# Patient Record
Sex: FEMALE | Race: WHITE | URBAN - METROPOLITAN AREA
[De-identification: names, ages, dates, MRNs, and addresses within clinical notes are randomized per-mention and may not be internally consistent; named-entity substitution may affect disease eponyms.]

---

## 2022-08-25 ENCOUNTER — HOSPITAL ENCOUNTER (OUTPATIENT)
Dept: PHYSICAL THERAPY | Age: 72
Discharge: HOME | End: 2022-08-25
Payer: MEDICARE

## 2022-08-25 PROCEDURE — 97530 THERAPEUTIC ACTIVITIES: CPT

## 2022-08-25 PROCEDURE — 97162 PT EVAL MOD COMPLEX 30 MIN: CPT

## 2022-08-25 PROCEDURE — 97110 THERAPEUTIC EXERCISES: CPT

## 2022-08-25 NOTE — PROGRESS NOTES
PT DAILY TREATMENT NOTE/LSVT BIG EVAL 10-18    Patient Name: Zulma Roman  Date:2022  : 1950  [x]  Patient  Verified  Payor: Eva Barron / Plan: [address] / Product Type: Managed Care Medicare /    In time:1102  Out time:1149  Total Treatment Time (min): 47  Visit #: 1 of 16    Medicare/BCBS Only   Total Timed Codes (min):  23 1:1 Treatment Time:  47     Treatment Area: Other abnormal involuntary movements [R25.8]  Parkinson's disease [G20]    SUBJECTIVE  Pain Level (0-10 scale): 5/10  []constant [x]intermittent []improving []worsening []no change since onset    Any medication changes, allergies to medications, adverse drug reactions, diagnosis change, or new procedure performed?: [x] No    [] Yes (see summary sheet for update)  Subjective functional status/changes:     PLOF: ambulatory with [facility] prior to July, lives with , loves sewing, painting, do crafts, swim, left handed,   Mechanism of Injury: Pt reports that she had a tremor in left hand tremor and left leg tremors along with altered gait pattern which sent her the the MD.  Pt reported that she received a parkinson's diagnosis in 2022. Pt was referred to PT for gait training and strengthening. Pt reported last fall was in January backwards. Current symptoms/Complaints: tremors left hand, tremors left leg, difficulty with buttoning, difficulty with dressing, difficulty with cooking, Falls, difficulty with walking, difficulty with opening jars, difficulty with holding glassware, difficulty with doing craft. Previous Treatment/Compliance: Carvadopa levadopa   PMHx/Surgical Hx: sciatica, Mini stroke,  Right knee OA, Osteoporosis, High BP, Tyroid disorders, Crohns, Lalo Foot toe fixation, scoliosis, urinary incontinence. Work Hx: retired  Living Situation: 1 story house, 2 steps to hobby room, lives with . Pt Goals:  To be able to be stronger, have better balance, and improve my walking   Barriers: []pain []financial []time []transportation []other  Motivation: good   Substance use: []Alcohol []Tobacco []other:   Cognition: A & O x 3    Other:    Functional Activities:    OBJECTIVE    24 min [x]Eval                  []Re-Eval       8 min Therapeutic Exercise:  [] See flow sheet : education on Big Exercises and how to incorporate at home    Rationale: increase ROM, increase strength, improve coordination, improve balance, and increase proprioception to improve the patients ability to restore PLOF    15 min Therapeutic Activity:  []  See flow sheet : PT educated on LSVT and how it effects parkinsons, education on parkinson's diagnosis and defects associated with it. PT educated on lifestyle change and BIG concepts.     Rationale: increase ROM, increase strength, improve coordination, improve balance, and increase proprioception  to improve the patients ability to complete transfers and ADLs without external assist             With   [x] TE   [x] TA   [] neuro   [] other: Patient Education: [x] Review HEP    [] Progressed/Changed HEP based on:   [] positioning   [] body mechanics   [] transfers   [] heat/ice application    [] other:        General Evaluation    Posture: [] Poor    [x] Fair    [] Good    Describe:   anterior trunk lean    Gait: [] Normal    [x] Abnormal    Device:    rollator   Description: decreased foot clearance left leg    ROM/Strength      Strength (1-5)  Hip Left Right   Flexion 3+ 3+   Abduction 3+ 3+   ER 3+ 3+   IR 3+ 3+   Knee Left Right   Extension 3+ 3+   Flexion 3+ 3+   Ankle     Plantar Flexion 2+ 2+   Dorsiflexion 3+ 3+     Strength (MMT):  Shoulder Left  (1-5) Right (1-5)   Shoulder Flexion 3+ 3+   Shoulder ABD 3+ 3+   Shoulder IR 3 3+   Shoulder ER 3 3+         Elbow  Left (0-5) Right  (0-5)   Flexion 3+ 3+   Extension 3+ 3+    fair fair                                             Optional Tests:        MiniBestEST test: 17/28    Other test /comments:  TU sec  5x Sit<>stand: 12 sec     Pain Level (0-10 scale) post treatment: 5/10    ASSESSMENT/Changes in Function: 82 yo female who presents to [facility] with new diagnosis of Parkinson's disease. Pt reports being ambulatory with [facility] prior to July. Pt lives with  and loves sewing, painting, do crafts, swim. Pt reports primary co tremors left hand, tremors left leg, difficulty with buttoning, difficulty with dressing, difficulty with cooking, Falls, difficulty with walking, difficulty with opening jars, difficulty with holding glassware, and difficulty with doing crafts. PT has initially tried medication of carbidopa/levodopa with slight reductions in spasms. Patient demonstrates decreased ROM, decreased strength, impaired posture, impaired gait mechancis, pain and decreased functional mobility tolerance. Patient will continue to benefit from skilled PT services to modify and progress therapeutic interventions, address functional mobility deficits, address ROM deficits, address strength deficits, analyze and cue movement patterns, analyze and modify body mechanics/ergonomics, assess and modify postural abnormalities, address imbalance/dizziness and instruct in home and community integration to attain remaining goals. Patient is appropriate for the evidence based LSVT BIG treatment, an intensive four-week therapy intervention which trains clients with Parkinson's disease and other disorders to move with increased (more normal) amplitude and improved quality of movement used in their every day lives. This is done through one-hour long skilled therapy sessions four times a week for four weeks focusing on progressively challenging large amplitude exercises, functional activities and gait training.   Client is then responsible for a homework portion where exercises and learned skilled are practiced one to two times a day during and after completion of program.       [x]  See Plan of Care  []  See progress note/recertification  []  See Discharge Summary         Progress towards goals / Updated goals:  Short Term Goals: To be accomplished in 2 weeks:   Patient will report compliance with HEP at least 1x/day to aid in rehabilitation program.   Status at IE: to be given at visit 2   Current:     Patient will decrease TUG by 3 seconds to display MCID and progression to decreased falls risk. Status at IE: 13 sec   Current:      Patient will improve Mini BEST est test score to 25/28 to improve safety with gait activities. Eval Status: 17/28       Long Term Goals: To be accomplished in 6 weeks:   Patient will increase strength as indicated by sit to stand transfers to 5 repetitions in < 10  seconds throughout Lalo LEs to aid in return recreational activities and ADLs. Status at IE: 12 sec   Current:     Patient will be able to ambulate 1000 feet with SPC and good step over step gait pattern to demonstrate decreased risk for falls. Status at IE: ambulatory with Rollator and SPC using SBA for cane and mod ind for rollator. Anterior trunk lean with both AD. Decreased foot clearance noted    Current:      Patient will improve FOTO to full points overall to demonstrate improvement in functional ability.    Status at IE:to be taken at visit 2    Current:    PLAN  []  Upgrade activities as tolerated     [x]  Continue plan of care  []  Update interventions per flow sheet       []  Discharge due to:_  []  Other:_      Juan Reed, PT 8/25/2022  10:07 AM

## 2022-08-25 NOTE — PROGRESS NOTES
[facility]  [address] [facility]  [facility], [address]  Ph [phone]  Fx [phone]    Plan of Care/ Statement of Necessity for Physical Therapy Services    Patient name: Lupe Gresham Start of Care: 2022   Referral source: Raheem Amin MD : 1950    Medical Diagnosis: Other abnormal involuntary movements [R25.8]  Parkinson's disease [G20]   Onset Date:2022    Treatment Diagnosis: Other abnormal involuntary movements; parkinsons disease                                              ICD-10: R25.8, G20   Prior Hospitalization: see medical history Provider#: [id number]   Medications: Verified on Patient summary List    Comorbidities:  sciatica, Mini stroke,  Right knee OA, Osteoporosis, High BP, Tyroid disorders, Crohns, Lalo Foot toe fixation, scoliosis, urinary incontinence. Prior Level of Function: ambulatory with [facility] prior to July, lives with , loves sewing, painting, do crafts, swim, left handed      The Plan of Care and following information is based on the information from the initial evaluation. Assessment/ key information: 80 yo female who presents to [facility] with new diagnosis of Parkinson's disease. Pt reports being ambulatory with [facility] prior to July. Pt lives with  and loves sewing, painting, do crafts, swim. Pt reports primary co tremors left hand, tremors left leg, difficulty with buttoning, difficulty with dressing, difficulty with cooking, Falls, difficulty with walking, difficulty with opening jars, difficulty with holding glassware, and difficulty with doing crafts. PT has initially tried medication of carbidopa/levodopa with slight reductions in spasms. Patient demonstrates decreased ROM, decreased strength, impaired posture, impaired gait mechancis, pain and decreased functional mobility tolerance.      Patient will continue to benefit from skilled PT services to modify and progress therapeutic interventions, address functional mobility deficits, address ROM deficits, address strength deficits, analyze and cue movement patterns, analyze and modify body mechanics/ergonomics, assess and modify postural abnormalities, address imbalance/dizziness and instruct in home and community integration to attain remaining goals. Patient is appropriate for the evidence based LSVT BIG treatment, an intensive four-week therapy intervention which trains clients with Parkinson's disease and other disorders to move with increased (more normal) amplitude and improved quality of movement used in their every day lives. This is done through one-hour long skilled therapy sessions four times a week for four weeks focusing on progressively challenging large amplitude exercises, functional activities and gait training.   Client is then responsible for a homework portion where exercises and learned skilled are practiced one to two times a day during and after completion of program.    Evaluation Complexity History HIGH Complexity :3+ comorbidities / personal factors will impact the outcome/ POC ; Examination HIGH Complexity : 4+ Standardized tests and measures addressing body structure, function, activity limitation and / or participation in recreation  ;Presentation MEDIUM Complexity : Evolving with changing characteristics  Overall Complexity Rating: MEDIUM    Problem List: pain affecting function, decrease ROM, decrease strength, edema affecting function, impaired gait/ balance, decrease ADL/ functional abilitiies, decrease activity tolerance, decrease flexibility/ joint mobility, and decrease transfer abilities   Treatment Plan may include any combination of the following: Therapeutic exercise, Therapeutic activities, Neuromuscular re-education, Physical agent/modality, Gait/balance training, Manual therapy, Aquatic therapy, Patient education, Self Care training, Functional mobility training, Home safety training, and Stair training  Patient / Family readiness to learn indicated by: asking questions, trying to perform skills, and interest  Persons(s) to be included in education: patient (P)  Barriers to Learning/Limitations: None  Measures taken if barriers to learning: NA  Patient Goal (s): : To be able to be stronger, have better balance, and improve my walking   Patient Self Reported Health Status: fair  Rehabilitation Potential: good    Short Term Goals: To be accomplished in 2 weeks:              Patient will report compliance with HEP at least 1x/day to aid in rehabilitation program.              Status at IE: to be given at visit 2              Current:                 Patient will decrease TUG by 3 seconds to display MCID and progression to decreased falls risk. Status at IE: 13 sec              Current:                            Patient will improve Mini BEST est test score to 25/28 to improve safety with gait activities. Eval Status: 17/28        Long Term Goals: To be accomplished in 6 weeks:              Patient will increase strength as indicated by sit to stand transfers to 5 repetitions in < 10  seconds throughout Lalo LEs to aid in return recreational activities and ADLs. Status at IE: 12 sec              Current:                 Patient will be able to ambulate 1000 feet with SPC and good step over step gait pattern to demonstrate decreased risk for falls. Status at IE: ambulatory with Rollator and SPC using SBA for cane and mod ind for rollator. Anterior trunk lean with both AD. Decreased foot clearance noted               Current:                            Patient will improve FOTO to full points overall to demonstrate improvement in functional ability. Status at IE:to be taken at visit 2               Current:    Frequency / Duration: Patient to be seen 4 times per week for 4 weeks.     Patient/ Caregiver education and instruction: Diagnosis, prognosis, self care, activity modification, and exercises   [x]  Plan of care has been reviewed with PTA    Certification Period: 08/25/22-11/23/22    Cathie Lomeli, PT 8/25/2022 10:06 AM    ________________________________________________________________________    I certify that the above Therapy Services are being furnished while the patient is under my care. I agree with the treatment plan and certify that this therapy is necessary.     Physician's Signature:_____________________Date:____________TIME:________                                      Matheus Garcia MD      ** Signature, Date and Time must be completed for valid certification **  Please sign and return to [facility] at [facility]  [address], [address] [facility]  Ph [phone]  Fx [phone]

## 2022-08-30 ENCOUNTER — HOSPITAL ENCOUNTER (OUTPATIENT)
Dept: PHYSICAL THERAPY | Age: 72
Discharge: HOME | End: 2022-08-30
Payer: MEDICARE

## 2022-08-30 PROCEDURE — 97530 THERAPEUTIC ACTIVITIES: CPT

## 2022-08-30 PROCEDURE — 97535 SELF CARE MNGMENT TRAINING: CPT

## 2022-08-30 PROCEDURE — 97112 NEUROMUSCULAR REEDUCATION: CPT

## 2022-08-30 NOTE — PROGRESS NOTES
PT DAILY TREATMENT NOTE    Patient Name: Katie Parekh  Date:2022  : 1950  [x]  Patient  Verified  Payor: Shasta Suero / Plan: [employer] [address] / Product Type: Managed Care Medicare /    In time:10:15  Out time:11:09  Total Treatment Time (min): 54  Total Timed Codes (min): 54  1:1 Treatment Time (MC/BCBS only): 54   Visit #: 2 of 16    Treatment Dx: Other abnormal involuntary movements [R25.8]  Parkinson's disease [G20]    SUBJECTIVE  Pain Level (0-10 scale): 10  Any medication changes, allergies to medications, adverse drug reactions, diagnosis change, or new procedure performed?: [x] No    [] Yes (see summary sheet for update)  Subjective functional status/changes:   [] No changes reported  \"I have some pain in my back from scoliosis. \"    OBJECTIVE    12 min Therapeutic Activity:  [x]  See flow sheet :   Rationale: increase ROM, increase strength, and improve coordination  to improve the patients ability to return to prior level of physical activity. 30 min Neuromuscular Re-education:  [x]  See flow sheet :   Rationale: increase ROM, increase strength, and improve coordination  to improve the patients ability to return to prior level of physical activity. 12 min Self Care: Reviewed LS   Rationale:    increase ROM, increase strength, and improve coordination to improve the patients ability to return to prior level of physical activity. With   [] TE   [] TA   [] neuro   [] other: Patient Education: [x] Review HEP    [] Progressed/Changed HEP based on:   [] positioning   [] body mechanics   [] transfers   [] heat/ice application    [] other:      Other Objective/Functional Measures:  Other Objective/Functional Measures  Vitals After Ex End of Tx   Heart Rate 101 bpm 84 bpm   Blood pressure 190/95 mmHg 160/78 mmHg   SpO2 98% 97%          Pain Level (0-10 scale) post treatment: 3/10    ASSESSMENT/Changes in Function: Pt arrived reporting mild pain in low back. Pt was able to perform LSVT BIG ex with no increased pain but, did require several rest breaks. Pt did have instance of mild light headedness and after assessing BP, pt was found to have a greatly increased BP. Pt returned to more normal levels after short rest break but, remained with elevated systolic post tx. Pt reports currently on BP medication and has a follow-up with PCP on Friday 9/2/22. Pt demonstrated good performance with BIG ex overall requiring moderate cues throughout for BIG UE movements and proper weight shifts. Patient will continue to benefit from skilled PT services to modify and progress therapeutic interventions, address functional mobility deficits, address ROM deficits, address strength deficits, analyze and address soft tissue restrictions, analyze and cue movement patterns, and analyze and modify body mechanics/ergonomics to attain remaining goals. [x]  See Plan of Care  []  See progress note/recertification  []  See Discharge Summary         Progress towards goals / Updated goals:  Short Term Goals: To be accomplished in 2 weeks:              Patient will report compliance with HEP at least 1x/day to aid in rehabilitation program.              Status at IE: to be given at visit 2              Current:Pt reports attempted compliance every day, 1x/day 8/30/22                 Patient will decrease TUG by 3 seconds to display MCID and progression to decreased falls risk. Status at IE: 13 sec              Current:                            Patient will improve Mini BEST est test score to 25/28 to improve safety with gait activities. Eval Status: 17/28        Long Term Goals: To be accomplished in 6 weeks:              Patient will increase strength as indicated by sit to stand transfers to 5 repetitions in < 10  seconds throughout Lalo LEs to aid in return recreational activities and ADLs.               Status at IE: 12 sec Current:                 Patient will be able to ambulate 1000 feet with SPC and good step over step gait pattern to demonstrate decreased risk for falls. Status at IE: ambulatory with Rollator and SPC using SBA for cane and mod ind for rollator. Anterior trunk lean with both AD. Decreased foot clearance noted               Current:                            Patient will improve FOTO to full points overall to demonstrate improvement in functional ability.               Status at IE:to be taken at visit 2               Current:       PLAN  [x]  Upgrade activities as tolerated     [x]  Continue plan of care  []  Update interventions per flow sheet       []  Discharge due to:_  []  Other:_      Segundo Washburn PTA 8/30/2022  10:12 AM    Future Appointments   Date Time Provider Keagan Marshall   8/30/2022 10:15 AM [facility]   8/31/2022  1:15 PM [facility]   9/2/2022 11:00 AM [facility]   9/7/2022 11:00 AM [facility] [place], [facility]   9/9/2022 11:00 AM [facility]   9/20/2022  2:45 PM [facility]   9/21/2022 11:00 AM [facility] [place]   9/22/2022 11:00 AM [facility] [place], [facility]   9/23/2022 11:00 AM [facility]   9/26/2022 11:00 AM [facility]   9/27/2022 11:00 AM [facility]   9/28/2022 11:00 AM [facility]   9/29/2022 12:30 PM Kitty Watkins [facility]

## 2022-08-31 ENCOUNTER — HOSPITAL ENCOUNTER (OUTPATIENT)
Dept: PHYSICAL THERAPY | Age: 72
Discharge: HOME | End: 2022-08-31
Payer: MEDICARE

## 2022-08-31 PROCEDURE — 97535 SELF CARE MNGMENT TRAINING: CPT

## 2022-08-31 PROCEDURE — 97112 NEUROMUSCULAR REEDUCATION: CPT

## 2022-08-31 PROCEDURE — 97530 THERAPEUTIC ACTIVITIES: CPT

## 2022-08-31 NOTE — PROGRESS NOTES
PT DAILY TREATMENT NOTE    Patient Name: Melyssa Ayoub  Date:2022  : 1950  [x]  Patient  Verified  Payor: [employer] / Plan: [address] / Product Type: Managed Care Medicare /    In time:1:20  Out time:2:15  Total Treatment Time (min): 55  Total Timed Codes (min): 55  1:1 Treatment Time (MC/BCBS only): 55   Visit #: 3 of 16    Treatment Dx: Other abnormal involuntary movements [R25.8]  Parkinson's disease [G20]    SUBJECTIVE  Pain Level (0-10 scale): 4/10  Any medication changes, allergies to medications, adverse drug reactions, diagnosis change, or new procedure performed?: [x] No    [] Yes (see summary sheet for update)  Subjective functional status/changes:   [] No changes reported  \"I just have some pain in my back. \"    OBJECTIVE    12 min Therapeutic Activity:  [x]  See flow sheet :   Rationale: increase ROM, increase strength, and improve coordination  to improve the patients ability to return to prior level of physical activity. 33 min Neuromuscular Re-education:  [x]  See flow sheet :   Rationale: increase ROM, increase strength, and improve coordination  to improve the patients ability to return to prior level of physical activity. 10 min Self Care: Continued education with big gait mechanics and reviewed carryover of gripping activities for home performance   Rationale:    increase ROM, increase strength, and improve coordination to improve the patients ability to return to prior level of physical activity. With   [] TE   [] TA   [] neuro   [] other: Patient Education: [x] Review HEP    [] Progressed/Changed HEP based on:   [] positioning   [] body mechanics   [] transfers   [] heat/ice application    [] other:      Other Objective/Functional Measures:  Other Objective/Functional Measures  Vitals Pre After several exercises After 5 min rest Post Tx   Heart Rate 88 bpm 104 bpm  88 bpm 92 bpm   Blood pressure 170/82 mmHg 203/82 mmHg 182/83 mmHg 175/79 mmHg   SpO2 98% 96% 98% 98%          Pain Level (0-10 scale) post treatment: 3/10    ASSESSMENT/Changes in Function: Pt arrived reporting mild pain in low back and elevated BP level with no symptoms. Pt demonstrated increased shortness of breath and elevated vitals upon assessment but, again no abnormal symptoms associated with elevated BP. Pt performed BIG ex well with SBA but, no instances of loss of balance. Pt performed hand gripping activities well with increased fatigue but, not above tolerance. Pt reported mild low back pain post tx. Patient will continue to benefit from skilled PT services to modify and progress therapeutic interventions, address functional mobility deficits, address ROM deficits, address strength deficits, analyze and address soft tissue restrictions, analyze and cue movement patterns, and analyze and modify body mechanics/ergonomics to attain remaining goals. [x]  See Plan of Care  []  See progress note/recertification  []  See Discharge Summary         Progress towards goals / Updated goals:  Short Term Goals: To be accomplished in 2 weeks:              Patient will report compliance with HEP at least 1x/day to aid in rehabilitation program.              Status at IE: to be given at visit 2              Current:Pt reports attempted compliance every day, 1x/day 8/30/22                 Patient will decrease TUG by 3 seconds to display MCID and progression to decreased falls risk. Status at IE: 13 sec              Current:                            Patient will improve Mini BEST est test score to 25/28 to improve safety with gait activities. Eval Status: 17/28    Current: Pt performs standing BIG ex with SBA 8/31/22     Long Term Goals:  To be accomplished in 6 weeks:              Patient will increase strength as indicated by sit to stand transfers to 5 repetitions in < 10  seconds throughout Lalo LEs to aid in return recreational activities and ADLs. Status at IE: 12 sec              Current:                 Patient will be able to ambulate 1000 feet with SPC and good step over step gait pattern to demonstrate decreased risk for falls. Status at IE: ambulatory with Rollator and SPC using SBA for cane and mod ind for rollator. Anterior trunk lean with both AD. Decreased foot clearance noted               Current:                            Patient will improve FOTO to full points overall to demonstrate improvement in functional ability.               Status at IE:to be taken at visit 2               Current:    PLAN  [x]  Upgrade activities as tolerated     [x]  Continue plan of care  []  Update interventions per flow sheet       []  Discharge due to:_  []  Other:_      David Burrows PTA 8/31/2022  11:39 AM    Future Appointments   Date Time Provider Keagan Marshall   8/31/2022  1:15 PM [facility]   9/2/2022 11:00 AM [facility]   9/7/2022 11:00 AM Yuliya Chan [facility]   9/9/2022 11:00 AM [facility]   9/20/2022  2:45 PM [facility]   9/21/2022 11:00 AM Yuliya Chan [facility]   9/22/2022 11:00 AM Yuliya Chan [facility]   9/23/2022 11:00 AM [facility]   9/26/2022 11:00 AM [facility]   9/27/2022 11:00 AM [facility]   9/28/2022 11:00 AM [facility]   9/29/2022 12:30 PM Catarina Dalton [facility] THE [facility]

## 2022-09-02 ENCOUNTER — HOSPITAL ENCOUNTER (OUTPATIENT)
Dept: PHYSICAL THERAPY | Age: 72
Discharge: HOME | End: 2022-09-02
Payer: MEDICARE

## 2022-09-02 ENCOUNTER — TELEPHONE (OUTPATIENT)
Dept: PHYSICAL THERAPY | Age: 72
End: 2022-09-02

## 2022-09-02 PROCEDURE — 97112 NEUROMUSCULAR REEDUCATION: CPT

## 2022-09-02 PROCEDURE — 97535 SELF CARE MNGMENT TRAINING: CPT

## 2022-09-07 ENCOUNTER — APPOINTMENT (OUTPATIENT)
Dept: PHYSICAL THERAPY | Age: 72
End: 2022-09-07
Payer: MEDICARE

## 2022-09-09 ENCOUNTER — APPOINTMENT (OUTPATIENT)
Dept: PHYSICAL THERAPY | Age: 72
End: 2022-09-09
Payer: MEDICARE

## 2022-09-20 ENCOUNTER — APPOINTMENT (OUTPATIENT)
Dept: PHYSICAL THERAPY | Age: 72
End: 2022-09-20
Payer: MEDICARE

## 2022-09-21 ENCOUNTER — APPOINTMENT (OUTPATIENT)
Dept: PHYSICAL THERAPY | Age: 72
End: 2022-09-21
Payer: MEDICARE

## 2022-09-22 ENCOUNTER — APPOINTMENT (OUTPATIENT)
Dept: PHYSICAL THERAPY | Age: 72
End: 2022-09-22
Payer: MEDICARE

## 2022-09-22 ENCOUNTER — TELEPHONE (OUTPATIENT)
Dept: PHYSICAL THERAPY | Age: 72
End: 2022-09-22

## 2022-09-23 ENCOUNTER — APPOINTMENT (OUTPATIENT)
Dept: PHYSICAL THERAPY | Age: 72
End: 2022-09-23
Payer: MEDICARE

## 2022-09-26 ENCOUNTER — APPOINTMENT (OUTPATIENT)
Dept: PHYSICAL THERAPY | Age: 72
End: 2022-09-26
Payer: MEDICARE

## 2022-09-27 ENCOUNTER — APPOINTMENT (OUTPATIENT)
Dept: PHYSICAL THERAPY | Age: 72
End: 2022-09-27
Payer: MEDICARE

## 2022-09-28 ENCOUNTER — HOSPITAL ENCOUNTER (OUTPATIENT)
Dept: PHYSICAL THERAPY | Age: 72
Discharge: HOME | End: 2022-09-28
Payer: MEDICARE

## 2022-09-28 ENCOUNTER — APPOINTMENT (OUTPATIENT)
Dept: PHYSICAL THERAPY | Age: 72
End: 2022-09-28
Payer: MEDICARE

## 2022-09-28 PROCEDURE — 97110 THERAPEUTIC EXERCISES: CPT

## 2022-09-28 PROCEDURE — 97530 THERAPEUTIC ACTIVITIES: CPT

## 2022-09-28 PROCEDURE — 97116 GAIT TRAINING THERAPY: CPT

## 2022-09-28 PROCEDURE — 97112 NEUROMUSCULAR REEDUCATION: CPT

## 2022-09-28 NOTE — PROGRESS NOTES
PT DAILY TREATMENT NOTE    Patient Name: Annette Sow  Date:2022  : 1950  [x]  Patient  Verified  Payor: [facility] / Plan: [address] / Product Type: Managed Care Medicare /    In time:2:48  Out time:3:48  Total Treatment Time (min): 60  Total Timed Codes (min): 60  1:1 Treatment Time (MC/BCBS only): 60   Visit #: 5 of 16    Treatment Dx: Other abnormal involuntary movements [R25.8]  Parkinson's disease [G20]    SUBJECTIVE  Pain Level (0-10 scale): 0/10  Any medication changes, allergies to medications, adverse drug reactions, diagnosis change, or new procedure performed?: [x] No    [] Yes (see summary sheet for update)  Subjective functional status/changes:   [] No changes reported  \"I haven't been doing any of the exercises because my doctor told me to hold off while we were getting my blood pressure right. It should4 be good now. \"    OBJECTIVE    8 min Therapeutic Exercise:  [x] See flow sheet :   Rationale: increase ROM, increase strength, and improve coordination to improve the patients ability to return to prior level of physical activity. 12 min Therapeutic Activity:  [x]  See flow sheet :   Rationale: increase ROM, increase strength, and improve coordination  to improve the patients ability to return to prior level of physical activity. 30 min Neuromuscular Re-education:  [x]  See flow sheet :   Rationale: increase ROM, increase strength, and improve coordination  to improve the patients ability to return to prior level of physical activity. s      10 min Gait Training:  ~200 feet with NO device on level surfaces with ___ level of assist (Big Walking)   Rationale: To improve ambulation safety and efficiency in order to improve patient's ability to safely ambulate at home for self care.                 With   [] TE   [] TA   [] neuro   [] other: Patient Education: [x] Review HEP    [] Progressed/Changed HEP based on:   [] positioning   [] body mechanics   [] transfers   [] heat/ice application    [] other:      Other Objective/Functional Measures: Other Objective/Functional Measures  Vitals Pre After Several ex End of Tx   Heart Rate 86 bpm 97 bpm  92 bpm   Blood pressure 160/80 mmHg 135/75 mmHg 135/65 mmHg   SpO2 95% 95% 94%       Pain Level (0-10 scale) post treatment: 0/10    ASSESSMENT/Changes in Function: Pt has been absent from physical therapy due to difficulties with BP levels. Pt has since received new medications and has been cleared by doctor to return to PT. Pt presented with improved BP levels and was appropriate for therapy. Pt demonstrated improvement with TUG test since initial eval but, has mildly regressed due to hold on exercise by MD. Pt was able to tolerate several LSVT BIG exercises with good BP levels. Pt overall has had minor regression or maintained previous levels of functionality since eval. Pt will benefit from continud therapy to address above deficits with LSVT BIG program as focus for symptoms of Parkinson's. Patient will continue to benefit from skilled PT services to modify and progress therapeutic interventions, address functional mobility deficits, address ROM deficits, address strength deficits, analyze and address soft tissue restrictions, analyze and cue movement patterns, and analyze and modify body mechanics/ergonomics to attain remaining goals. [x]  See Plan of Care  []  See progress note/recertification  []  See Discharge Summary         Progress towards goals / Updated goals:  Short Term Goals: To be accomplished in 2 weeks:              Patient will report compliance with HEP at least 1x/day to aid in rehabilitation program.              Status at IE: to be given at visit 2              Current: Unable to perform due to BP issues 9/28/22                 Patient will decrease TUG by 3 seconds to display MCID and progression to decreased falls risk.               Status at IE: 13 sec Current: 9 sec 9/28/22 MET                            Patient will improve Mini BEST est test score to 25/28 to improve safety with gait activities. Eval Status: 17/28               Current: 14/28 Regression since hold 9/28/22     Long Term Goals: To be accomplished in 6 weeks:              Patient will increase strength as indicated by sit to stand transfers to 5 repetitions in < 10  seconds throughout Lalo LEs to aid in return recreational activities and ADLs. Status at IE: 12 sec              Current: 12 Sec 9/28/22                 Patient will be able to ambulate 1000 feet with SPC and good step over step gait pattern to demonstrate decreased risk for falls. Status at IE: ambulatory with Rollator and SPC using SBA for cane and mod ind for rollator. Anterior trunk lean with both AD. Decreased foot clearance noted               Current: ~60 ft with [facility] 9/28/22                            Patient will improve FOTO to full points overall to demonstrate improvement in functional ability.               Status at IE:to be taken at visit 2               Current: 48 9/28/22    PLAN  [x]  Upgrade activities as tolerated     [x]  Continue plan of care  []  Update interventions per flow sheet       []  Discharge due to:_  []  Other:_      Raffi Arias, BRITTANI 9/28/2022  12:52 PM    Future Appointments   Date Time Provider Keagan Marshall   9/28/2022  2:45 PM Arianna Henry [facility]

## 2022-09-29 ENCOUNTER — APPOINTMENT (OUTPATIENT)
Dept: PHYSICAL THERAPY | Age: 72
End: 2022-09-29
Payer: MEDICARE

## 2022-10-03 ENCOUNTER — HOSPITAL ENCOUNTER (OUTPATIENT)
Dept: PHYSICAL THERAPY | Age: 72
Discharge: HOME | End: 2022-10-03
Payer: MEDICARE

## 2022-10-03 PROCEDURE — 97112 NEUROMUSCULAR REEDUCATION: CPT

## 2022-10-03 PROCEDURE — 97116 GAIT TRAINING THERAPY: CPT

## 2022-10-03 PROCEDURE — 97530 THERAPEUTIC ACTIVITIES: CPT

## 2022-10-03 NOTE — PROGRESS NOTES
PT DAILY TREATMENT NOTE    Patient Name: Courtenay Spurling  [id number]  : 1950  [x]  Patient  Verified  Payor: Tam Tineo / Plan: [address] / Product Type: Managed Care Medicare /    In time:118  Out time:205  Total Treatment Time (min): 47  Total Timed Codes (min): 47  1:1 Treatment Time (MC/BCBS only): 47   Visit #: 6 of 22    Treatment Dx: Other abnormal involuntary movements [R25.8]  Parkinson's disease [G20]    SUBJECTIVE  Pain Level (0-10 scale): 0/10  Any medication changes, allergies to medications, adverse drug reactions, diagnosis change, or new procedure performed?: [x] No    [] Yes (see summary sheet for update)  Subjective functional status/changes:   [] No changes reported   Pt reported that her BP has been 120/80 norris at home     OBJECTIVE    15 min Therapeutic Activity:  []  See flow sheet :   Rationale: increase ROM, increase strength, improve coordination, improve balance, and increase proprioception  to improve the patients ability to complete transfers and ADLs without external assist      23 min Neuromuscular Re-education:  []  See flow sheet :   Rationale: increase ROM, increase strength, improve coordination, improve balance, and increase proprioception  to improve the patients ability to activate ankle and hip stabilizers without compensation      9 min Gait Training:  320 feet with verbal instruction for arm swing. Rocking exercises working on weight shifting    Rationale: To improve ambulation safety and efficiency in order to improve patient's ability to safely ambulate at home for self care.            With   [] TE   [] TA   [] neuro   [] other: Patient Education: [x] Review HEP    [] Progressed/Changed HEP based on:   [] positioning   [] body mechanics   [] transfers   [] heat/ice application    [] other:      Other Objective/Functional Measures:   150/83 prior to therapy  175/89 after BIG 1, 2  152/82 after 5 min rest   162/84 after BIG 3,4  142/87 after big 5, 6  151/86 after STS  157/81 after gait        Pain Level (0-10 scale) post treatment: 0/10    ASSESSMENT/Changes in Function: Patient tolerated treatment session well today. Patient had no complaints with addition of full LSVT BIG Exercises 1-7 plus STS today to exercise program to accomplish improved balance and LE strength. Pt continues to need frequent monitoring of vitals and rest breaks due to HTN. Pt needed max tactile cues during all 1-7 exercises for positioning and mod cues for BIG movements. Pt showed improved balance with STS today and recommend progression to air ex pad next visit. Patient continues to make steady progress toward goals and would benefit from continued skilled PT intervention to address remaining deficits outlined in goals below. Patient will continue to benefit from skilled PT services to modify and progress therapeutic interventions, address functional mobility deficits, address ROM deficits, address strength deficits, analyze and address soft tissue restrictions, analyze and cue movement patterns, analyze and modify body mechanics/ergonomics, assess and modify postural abnormalities, address imbalance/dizziness, and instruct in home and community integration to attain remaining goals. [x]  See Plan of Care  []  See progress note/recertification  []  See Discharge Summary         Progress towards goals / Updated goals:  Short Term Goals: To be accomplished in 2 weeks:              Patient will report compliance with HEP at least 1x/day to aid in rehabilitation program.              Status at IE: to be given at visit 2              Current: Unable to perform due to BP issues 9/28/22   Current: is doing at least 8 of each exercise daily. 10/03/22                 Patient will decrease TUG by 3 seconds to display MCID and progression to decreased falls risk.               Status at IE: 13 sec              Current: 9 sec 9/28/22 MET Patient will improve Mini BEST est test score to 25/28 to improve safety with gait activities. Eval Status: 17/28               Current: 14/28 Regression since hold 9/28/22     Long Term Goals: To be accomplished in 6 weeks:              Patient will increase strength as indicated by sit to stand transfers to 5 repetitions in < 10  seconds throughout Lalo LEs to aid in return recreational activities and ADLs. Status at IE: 12 sec              Current: 12 Sec 9/28/22                 Patient will be able to ambulate 1000 feet with SPC and good step over step gait pattern to demonstrate decreased risk for falls. Status at IE: ambulatory with Rollator and SPC using SBA for cane and mod ind for rollator. Anterior trunk lean with both AD. Decreased foot clearance noted               Current: ~60 ft with [facility] 9/28/22                            Patient will improve FOTO to full points overall to demonstrate improvement in functional ability.               Status at IE:to be taken at visit 2               Current: 48 9/28/22    PLAN  []  Upgrade activities as tolerated     [x]  Continue plan of care  []  Update interventions per flow sheet       []  Discharge due to:_  []  Other:_      Cassidy Aguirre PT 10/3/2022  1:26 PM    Future Appointments   Date Time Provider Keagan Marshall   10/5/2022  1:15 PM [facility]   10/6/2022  1:15 PM [facility]   10/7/2022  2:45 PM [facility]   10/10/2022  1:15 PM [facility]   10/11/2022 12:30 PM [facility]   10/12/2022  2:45 PM [facility]   10/13/2022 12:30 PM [facility]   10/17/2022 12:30 PM [facility]   10/18/2022 12:30 PM Hardik Russ, PT [facility]   10/19/2022 12:30 PM Hardik Russ, [facility]   10/21/2022  2:00 PM [facility]   10/24/2022 12:30 PM [place]   10/25/2022 12:30 PM Brooke Keyes, PT [facility] THE [facility]   10/26/2022 12:30 PM Brooke Keyes, PT [facility] THE [facility]   10/27/2022 12:30 PM Brooke Keyes, [facility] THE [facility]

## 2022-10-05 ENCOUNTER — HOSPITAL ENCOUNTER (OUTPATIENT)
Dept: PHYSICAL THERAPY | Age: 72
Discharge: HOME | End: 2022-10-05
Payer: MEDICARE

## 2022-10-05 PROCEDURE — 97110 THERAPEUTIC EXERCISES: CPT

## 2022-10-05 PROCEDURE — 97116 GAIT TRAINING THERAPY: CPT

## 2022-10-05 PROCEDURE — 97530 THERAPEUTIC ACTIVITIES: CPT

## 2022-10-05 PROCEDURE — 97112 NEUROMUSCULAR REEDUCATION: CPT

## 2022-10-05 NOTE — PROGRESS NOTES
PT DAILY TREATMENT NOTE    Patient Name: Benji Steel  Date:10/5/2022  : 1950  [x]  Patient  Verified  Payor: Roberto Oakes / Plan: [address] / Product Type: Managed Care Medicare /    In time:1:15  Out time:2:10   Total Treatment Time (min): 55  Total Timed Codes (min): 55  1:1 Treatment Time (MC/BCBS only): 55   Visit #: 7 of 16    Treatment Dx: Other abnormal involuntary movements [R25.8]  Parkinson's disease [G20]    SUBJECTIVE  Pain Level (0-10 scale): 0/10  Any medication changes, allergies to medications, adverse drug reactions, diagnosis change, or new procedure performed?: [x] No    [] Yes (see summary sheet for update)  Subjective functional status/changes:   [] No changes reported  \"I don't have any pain right now. \"    OBJECTIVE      10 min Therapeutic Exercise:  [x] See flow sheet :   Rationale: increase ROM, increase strength, and improve coordination to improve the patients ability to return to prior level of physical activity. 15 min Therapeutic Activity:  [x]  See flow sheet :   Rationale: increase ROM, increase strength, and improve coordination  to improve the patients ability to return to prior level of physical activity. 20 min Neuromuscular Re-education:  [x]  See flow sheet :   Rationale: increase ROM, increase strength, and improve coordination  to improve the patients ability to return to prior level of physical activity. 10 min Gait Training:  ~300 feet with No device on level surfaces with Supervision level of assistance   Rationale: To improve ambulation safety and efficiency in order to improve patient's ability to safely ambulate at home for self care. With   [] TE   [] TA   [] neuro   [] other: Patient Education: [x] Review HEP    [] Progressed/Changed HEP based on:   [] positioning   [] body mechanics   [] transfers   [] heat/ice application    [] other:      Other Objective/Functional Measures:   Other Objective/Functional Measures  Vitals Pre After BIG 1-4 End of Tx   Heart Rate 77 bpm 102 bpm  82 bpm   Blood pressure 155/82 mmHg 154/90 mmHg 157/87 mmHg         Pain Level (0-10 scale) post treatment: 0/10    ASSESSMENT/Changes in Function: Pt arrived in clinic, reporting no pain. Pt demonstrated good performance of LSVT BIG exercises with Supervision and no instances of loss of balance or UE support need throughout tx. Pt performed BIG exercises with improved pace and had increased time for functional component tasks. Pt vitals maintained acceptable levels for activity throughout tx and pt reported no abnormal symptoms throughout tx. Pt reported increased fatigue post tx but, not above tolerance. Pt required increased cueing during Handwriting, tending to decrease size of letters as pt repeated writing. Pt will benefit from continued therapy with LSVT BIG to address continued deficits of Parkinson's. Patient will continue to benefit from skilled PT services to modify and progress therapeutic interventions, address functional mobility deficits, address ROM deficits, address strength deficits, analyze and address soft tissue restrictions, analyze and cue movement patterns, and analyze and modify body mechanics/ergonomics to attain remaining goals. [x]  See Plan of Care  []  See progress note/recertification  []  See Discharge Summary         Progress towards goals / Updated goals:  Short Term Goals: To be accomplished in 2 weeks:              Patient will report compliance with HEP at least 1x/day to aid in rehabilitation program.              Status at IE: to be given at visit 2              Last PN: Unable to perform due to BP issues 9/28/22              Current: is doing at least 8 of each exercise daily. 10/03/22                 Patient will decrease TUG by 3 seconds to display MCID and progression to decreased falls risk.               Status at IE: 13 sec              Current: 9 sec 9/28/22 MET Patient will improve Mini BEST est test score to 25/28 to improve safety with gait activities. Eval Status: 17/28              Last PN: 14/28 Regression since hold 9/28/22    Current:     Long Term Goals: To be accomplished in 6 weeks:              Patient will increase strength as indicated by sit to stand transfers to 5 repetitions in < 10  seconds throughout Lalo LEs to aid in return recreational activities and ADLs. Status at IE: 12 sec              Last PN: 12 Sec 9/28/22  Current: Pt progressed to performing sit to stands with airex pad for increased focused with stability 10/5/22                 Patient will be able to ambulate 1000 feet with SPC and good step over step gait pattern to demonstrate decreased risk for falls. Status at IE: ambulatory with Rollator and SPC using SBA for cane and mod ind for rollator. Anterior trunk lean with both AD. Decreased foot clearance noted              Last PN: ~60 ft with [facility] 9/28/22  Current:                             Patient will improve FOTO to full points overall to demonstrate improvement in functional ability.               Status at IE:to be taken at visit 2               Last PN: 48 9/28/22    Current:     PLAN  [x]  Upgrade activities as tolerated     [x]  Continue plan of care  []  Update interventions per flow sheet       []  Discharge due to:_  []  Other:_      Marylee Buckle, PTA 10/5/2022  1:15 PM    Future Appointments   Date Time Provider Keagan Marshall   10/6/2022  1:15 PM [facility]   10/7/2022  2:45 PM [facility]   10/10/2022  1:15 PM [facility]   10/11/2022 12:30 PM [facility]   10/12/2022  2:45 PM [facility]   10/13/2022 12:30 PM [facility]   10/17/2022 12:30 PM [facility]   10/18/2022 12:30 PM Arnel Heath, [facility] THE [facility]   10/19/2022 12:30 PM York Olgas, PT [facility]   10/21/2022  2:00 PM [facility]   10/24/2022 12:30 PM [facility]   10/25/2022 12:30 PM York Olgas, PT [facility]   10/26/2022 12:30 PM York Olgas, PT [facility]   10/27/2022 12:30 PM York Olgas, [facility]

## 2022-10-06 ENCOUNTER — HOSPITAL ENCOUNTER (OUTPATIENT)
Dept: PHYSICAL THERAPY | Age: 72
Discharge: HOME | End: 2022-10-06
Payer: MEDICARE

## 2022-10-06 PROCEDURE — 97116 GAIT TRAINING THERAPY: CPT

## 2022-10-06 PROCEDURE — 97530 THERAPEUTIC ACTIVITIES: CPT

## 2022-10-06 PROCEDURE — 97112 NEUROMUSCULAR REEDUCATION: CPT

## 2022-10-06 NOTE — PROGRESS NOTES
PT DAILY TREATMENT NOTE    Patient Name: Pro Goldman  Date:10/6/2022  : 1950  [x]  Patient  Verified  Payor: Oliva Hearn / Plan: [address] / Product Type: Managed Care Medicare /    In time:1:15  Out time:2:00  Total Treatment Time (min): 45  Total Timed Codes (min): 45  1:1 Treatment Time (MC/BCBS only): 45   Visit #: 8 of 16    Treatment Dx: Other abnormal involuntary movements [R25.8]  Parkinson's disease [G20]    SUBJECTIVE  Pain Level (0-10 scale): 0/10  Any medication changes, allergies to medications, adverse drug reactions, diagnosis change, or new procedure performed?: [x] No    [] Yes (see summary sheet for update)  Subjective functional status/changes:   [] No changes reported  \"I don't have any pain right now. \"    OBJECTIVE    10 min Therapeutic Activity:  [x]  See flow sheet :   Rationale: increase ROM, increase strength, and improve coordination  to improve the patients ability to return to prior level of physical activity. 25 min Neuromuscular Re-education:  [x]  See flow sheet :   Rationale: increase ROM, increase strength, and improve coordination  to improve the patients ability to return to prior level of physical activity. 10 min Gait Training:  ~400 feet with No device on level surfaces with level of assist   Rationale: To improve ambulation safety and efficiency in order to improve patient's ability to safely ambulate at home for self care. With   [] TE   [] TA   [] neuro   [] other: Patient Education: [x] Review HEP    [] Progressed/Changed HEP based on:   [] positioning   [] body mechanics   [] transfers   [] heat/ice application    [] other:      Other Objective/Functional Measures:  Other Objective/Functional Measures  Vitals Pre After 5 min rest break After Big Ex 1-4 Post Tx   Heart Rate 77 bpm 78 bpm  102 bpm 106 bpm   Blood pressure 154/90 mmHg 147/90 mmHg 150/89 mmHg 140/89 mmHg         Pain Level (0-10 scale) post treatment: 0/10    ASSESSMENT/Changes in Function: Pt arrived in clinic reporting mild fatigue in general with mild increased in tremor symptoms in Left LE today. Pt presented with moderately elevated BP but, did not present with any symptoms. Pt was able to tolerate LSVT BIG ex with supervision and no losses of balance. Pt demonstrated increased fatigue with standing BIG exercises and required several rest breaks. Pt demonstrated stable BP throughout tx. Pt demonstrates good BIG Gait initially but, demonstrated reduced arm swing. Pt was very fatigued post tx but, demonstrated continued stable BP. Patient will continue to benefit from skilled PT services to modify and progress therapeutic interventions, address functional mobility deficits, address ROM deficits, address strength deficits, analyze and address soft tissue restrictions, analyze and cue movement patterns, and analyze and modify body mechanics/ergonomics to attain remaining goals. [x]  See Plan of Care  []  See progress note/recertification  []  See Discharge Summary         Progress towards goals / Updated goals:  Short Term Goals: To be accomplished in 2 weeks:              Patient will report compliance with HEP at least 1x/day to aid in rehabilitation program.              Status at IE: to be given at visit 2              Last PN: Unable to perform due to BP issues 9/28/22              Current: is doing at least 8 of each exercise daily. 10/03/22                 Patient will decrease TUG by 3 seconds to display MCID and progression to decreased falls risk. Status at IE: 13 sec              Current: 9 sec 9/28/22 MET                            Patient will improve Mini BEST est test score to 25/28 to improve safety with gait activities. Eval Status: 17/28              Last PN: 14/28 Regression since hold 9/28/22               Current:      Long Term Goals:  To be accomplished in 6 weeks:              Patient will increase strength as indicated by sit to stand transfers to 5 repetitions in < 10  seconds throughout Lalo LEs to aid in return recreational activities and ADLs. Status at IE: 12 sec              Last PN: 12 Sec 9/28/22  Current: Pt progressed to performing sit to stands with airex pad for increased focused with stability 10/5/22                 Patient will be able to ambulate 1000 feet with SPC and good step over step gait pattern to demonstrate decreased risk for falls. Status at IE: ambulatory with Rollator and SPC using SBA for cane and mod ind for rollator. Anterior trunk lean with both AD. Decreased foot clearance noted              Last PN: ~60 ft with [facility] 9/28/22  Current:                              Patient will improve FOTO to full points overall to demonstrate improvement in functional ability.               Status at IE:to be taken at visit 2               Last PN: 48 9/28/22               Current:     PLAN  [x]  Upgrade activities as tolerated     [x]  Continue plan of care  []  Update interventions per flow sheet       []  Discharge due to:_  []  Other:_      Helga Gagnon PTA 10/6/2022  8:38 AM    Future Appointments   Date Time Provider Keagan Marshall   10/6/2022  1:15 PM [facility]   10/7/2022  2:45 PM [facility]   10/10/2022  1:15 PM [facility]   10/11/2022 12:30 PM [facility]   10/12/2022  2:45 PM [facility]   10/13/2022 12:30 PM [facility]   10/17/2022 12:30 PM [facility]   10/18/2022 12:30 PM Selina Wilkinson, [facility]   10/19/2022 12:30 PM Selina Wilkinson, [facility] [place] THE [facility]   10/21/2022  2:00 PM [facility] CENTER   10/24/2022 12:30 PM Jaki [facility]   10/25/2022 12:30 PM Selinaмария Locok, [address] [facility]   10/26/2022 12:30 PM Selina Clink, [address] [facility]   10/27/2022 12:30 PM Geovanni Deleon, PT [facility] THE [facility]

## 2022-10-07 ENCOUNTER — HOSPITAL ENCOUNTER (OUTPATIENT)
Dept: PHYSICAL THERAPY | Age: 72
Discharge: HOME | End: 2022-10-07
Payer: MEDICARE

## 2022-10-07 PROCEDURE — 97530 THERAPEUTIC ACTIVITIES: CPT

## 2022-10-07 PROCEDURE — 97535 SELF CARE MNGMENT TRAINING: CPT

## 2022-10-07 PROCEDURE — 97110 THERAPEUTIC EXERCISES: CPT

## 2022-10-07 PROCEDURE — 97112 NEUROMUSCULAR REEDUCATION: CPT

## 2022-10-07 NOTE — PROGRESS NOTES
PT DAILY TREATMENT NOTE    Patient Name: Claude Anguiano  Date:10/7/2022  : 1950  [x]  Patient  Verified  Payor: Angelica Cortez / Plan: [employer] [address] / Product Type: Managed Care Medicare /    In time:2:45  Out time:3:43  Total Treatment Time (min): 58  Total Timed Codes (min): 58  1:1 Treatment Time (MC/BCBS only): 58   Visit #: 9 of 16    Treatment Dx: Other abnormal involuntary movements [R25.8]  Parkinson's disease [G20]    SUBJECTIVE  Pain Level (0-10 scale): 0/10  Any medication changes, allergies to medications, adverse drug reactions, diagnosis change, or new procedure performed?: [x] No    [] Yes (see summary sheet for update)  Subjective functional status/changes:   [] No changes reported      OBJECTIVE    10 min Therapeutic Exercise:  [x] See flow sheet :   Rationale: increase ROM, increase strength, and improve coordination to improve the patients ability to return to prior level of physical activity. 10 min Therapeutic Activity:  [x]  See flow sheet :   Rationale: increase ROM, increase strength, and improve coordination  to improve the patients ability to return to prior level of physical activity. 28 min Neuromuscular Re-education:  [x]  See flow sheet :   Rationale: increase strength and improve coordination  to improve the patients ability to return to prior level of physical activity. 10 min Self Care: Home gripping and carrying carry over education, repetitive home reaching education HEP    Rationale:    increase ROM, increase strength, and improve coordination to improve the patients ability to return to prior level of physical activity.              With   [] TE   [] TA   [] neuro   [] other: Patient Education: [x] Review HEP    [] Progressed/Changed HEP based on:   [] positioning   [] body mechanics   [] transfers   [] heat/ice application    [] other:      Other Objective/Functional Measures: BP: 150/75 (Pre-Tx), 154/82 (Post-Tx)     Pain Level (0-10 scale) post treatment: 0/10    ASSESSMENT/Changes in Function: Pt arrived reporting no pain and decreased fatigue from previous visit, having had a bed nights rest. Pt continues to demonstrated good performance of LSVT BIG ex with only very occasional VC to correct posture or step length. Pt was able to perform gripping ex with focus with holding large objects with wider  during ambulation. Pt increased fatigue with ex but, not above tolerance. Pt demonstrated improved handwriting with BIG pen strokes and smoother lines. Pt BP remained within acceptable levels this visit. Patient will continue to benefit from skilled PT services to modify and progress therapeutic interventions, address functional mobility deficits, address ROM deficits, address strength deficits, analyze and address soft tissue restrictions, and analyze and cue movement patterns to attain remaining goals. [x]  See Plan of Care  []  See progress note/recertification  []  See Discharge Summary         Progress towards goals / Updated goals:  Short Term Goals: To be accomplished in 2 weeks:              Patient will report compliance with HEP at least 1x/day to aid in rehabilitation program.              Status at IE: to be given at visit 2              Last PN: Unable to perform due to BP issues 9/28/22              Current: is doing at least 8 of each exercise daily. 10/03/22                 Patient will decrease TUG by 3 seconds to display MCID and progression to decreased falls risk. Status at IE: 13 sec              Current: 9 sec 9/28/22 MET                            Patient will improve Mini BEST est test score to 25/28 to improve safety with gait activities. Eval Status: 17/28              Last PN: 14/28 Regression since hold 9/28/22               Current:      Long Term Goals:  To be accomplished in 6 weeks:              Patient will increase strength as indicated by sit to stand transfers to 5 repetitions in < 10  seconds throughout Lalo LEs to aid in return recreational activities and ADLs. Status at IE: 12 sec              Last PN: 12 Sec 9/28/22  Current: Pt progressed to performing sit to stands with airex pad for increased focused with stability 10/5/22                 Patient will be able to ambulate 1000 feet with SPC and good step over step gait pattern to demonstrate decreased risk for falls. Status at IE: ambulatory with Rollator and SPC using SBA for cane and mod ind for rollator. Anterior trunk lean with both AD. Decreased foot clearance noted              Last PN: ~60 ft with [facility] 9/28/22  Current:                              Patient will improve FOTO to full points overall to demonstrate improvement in functional ability.               Status at IE:to be taken at visit 2               Last PN: 48 9/28/22               Current:     PLAN  [x]  Upgrade activities as tolerated     [x]  Continue plan of care  []  Update interventions per flow sheet       []  Discharge due to:_  []  Other:_      Raisa Reason, PTA 10/7/2022  10:50 AM    Future Appointments   Date Time Provider Keagan Marshall   10/7/2022  2:45 PM [facility]   10/10/2022  1:15 PM [facility]   10/11/2022 12:30 PM [facility]   10/12/2022  2:45 PM [facility]   10/13/2022 12:30 PM [facility]   10/17/2022 12:30 PM [facility] THE [facility]   10/18/2022 12:30 PM Abran Mayberry PT [facility]   10/19/2022 12:30 PM Abran Mayberry, [address] THE [facility]   10/21/2022  2:00 PM [facility]   10/24/2022 12:30 PM [facility]   10/25/2022 12:30 PM Abran Mayberry, [address] [facility] THE [facility]   10/26/2022 12:30 PM Abran Mayberry, [address] THE [facility]   10/27/2022 12:30 PM Abran Mayberry, [address] [facility] THE [facility]

## 2022-10-10 ENCOUNTER — HOSPITAL ENCOUNTER (OUTPATIENT)
Dept: PHYSICAL THERAPY | Age: 72
Discharge: HOME | End: 2022-10-10
Payer: MEDICARE

## 2022-10-10 PROCEDURE — 97530 THERAPEUTIC ACTIVITIES: CPT

## 2022-10-10 PROCEDURE — 97112 NEUROMUSCULAR REEDUCATION: CPT

## 2022-10-10 PROCEDURE — 97116 GAIT TRAINING THERAPY: CPT

## 2022-10-10 NOTE — PROGRESS NOTES
PT DAILY TREATMENT NOTE    Patient Name: Lynne Gutiérrez  Date:10/10/2022  : 1950  [x]  Patient  Verified  Payor: Lynette Gomez / Plan: [address] / Product Type: Managed Care Medicare /    In time:1:20  Out time:2:15  Total Treatment Time (min): 55  Total Timed Codes (min): 55  1:1 Treatment Time (MC/BCBS only): 55   Visit #: 10 of 16    Treatment Dx: Other abnormal involuntary movements [R25.8]  Parkinson's disease [G20]    SUBJECTIVE  Pain Level (0-10 scale): 0/10  Any medication changes, allergies to medications, adverse drug reactions, diagnosis change, or new procedure performed?: [x] No    [] Yes (see summary sheet for update)  Subjective functional status/changes:   [] No changes reported  \"I don't have any pain right now. \"    OBJECTIVE    15 min Therapeutic Activity:  [x]  See flow sheet :   Rationale: increase ROM, increase strength, and improve coordination  to improve the patients ability to return to prior level of physical activity. 30 min Neuromuscular Re-education:  [x]  See flow sheet :   Rationale: increase ROM, increase strength, and improve coordination  to improve the patients ability to return to prior level of physical activity. 10 min Gait Training:    ~550 feet with No assistive device on level surfaces with Supervision level of assist   Rationale: To improve ambulation safety and efficiency in order to improve patient's ability to safely ambulate at home for self care.                 With   [] TE   [] TA   [] neuro   [] other: Patient Education: [x] Review HEP    [] Progressed/Changed HEP based on:   [] positioning   [] body mechanics   [] transfers   [] heat/ice application    [] other:      Other Objective/Functional Measures: BP: 145/89 HR: 102          152/94 (Post Big walking)     Pain Level (0-10 scale) post treatment: 0/10    ASSESSMENT/Changes in Function: Pt arrived in clinic ambulating with SPC with no pain and reporting mild difficulty with over the weekend on Saturday but, improved on Sunday. Pt  was able to perform BIG exercises with no loss of balance and with Supervision. Pt initially performed STS on foam without UE and good BIG form but, when progressed to eyes closed, pt had several instances of forward loss of balance requiring therapist mod-a to avoid falling. After several repetitions, pt was able to perform without loss of balance. Pt was able to perform ambulation with dual tasking with only minor loss of arm swing but, able correct with VC. Pt also perform shirt buttoning activity with cues for BIG pulling  of shirt buttons and shirt holes. Pt reported greatly increased fatigue post tx. Patient will continue to benefit from skilled PT services to modify and progress therapeutic interventions, address functional mobility deficits, address ROM deficits, address strength deficits, analyze and address soft tissue restrictions, and analyze and cue movement patterns to attain remaining goals. [x]  See Plan of Care  []  See progress note/recertification  []  See Discharge Summary         Progress towards goals / Updated goals:  Short Term Goals: To be accomplished in 2 weeks:              Patient will report compliance with HEP at least 1x/day to aid in rehabilitation program.              Status at IE: to be given at visit 2              Last PN: Unable to perform due to BP issues 9/28/22              Current: is doing at least 8 of each exercise daily. 10/03/22                 Patient will decrease TUG by 3 seconds to display MCID and progression to decreased falls risk. Status at IE: 13 sec              Current: 9 sec 9/28/22 MET                            Patient will improve Mini BEST est test score to 25/28 to improve safety with gait activities. Eval Status: 17/28              Last PN: 14/28 Regression since hold 9/28/22               Current:      Long Term Goals:  To be accomplished in 6 weeks:              Patient will increase strength as indicated by sit to stand transfers to 5 repetitions in < 10  seconds throughout Lalo LEs to aid in return recreational activities and ADLs. Status at IE: 12 sec              Last PN: 12 Sec 9/28/22  Current: Pt progressed to performing sit to stands with airex pad for increased focused with stability 10/5/22                 Patient will be able to ambulate 1000 feet with SPC and good step over step gait pattern to demonstrate decreased risk for falls. Status at IE: ambulatory with Rollator and SPC using SBA for cane and mod ind for rollator. Anterior trunk lean with both AD. Decreased foot clearance noted              Last PN: ~60 ft with [facility] 9/28/22  Current:                              Patient will improve FOTO to full points overall to demonstrate improvement in functional ability.               Status at IE:to be taken at visit 2               Last PN: 48 9/28/22               Current:     PLAN  [x]  Upgrade activities as tolerated     [x]  Continue plan of care  []  Update interventions per flow sheet       []  Discharge due to:_  []  Other:_      Chelsea Mijares, PTA 10/10/2022  12:26 PM    Future Appointments   Date Time Provider Keagan Marshall   10/10/2022  1:15 PM [facility]   10/11/2022 12:30 PM [facility]   10/12/2022  2:45 PM [facility]   10/13/2022 12:30 PM [facility]   10/17/2022 12:30 PM [facility]   10/18/2022 12:30 PM Michael Cervantes, PT [facility]   10/19/2022 12:30 PM Michael Cervantes, [address] THE [facility]   10/21/2022  2:00 PM [facility]   10/24/2022 12:30 PM [facility]   10/25/2022 12:30 PM Michael Cervantes, [address] [facility] THE [facility]   10/26/2022 12:30 PM Michael Cervantes, [address] THE [facility]   10/27/2022 12:30 PM Michael Cervantes, [address] [facility] THE [facility]

## 2022-10-11 ENCOUNTER — HOSPITAL ENCOUNTER (OUTPATIENT)
Dept: PHYSICAL THERAPY | Age: 72
Discharge: HOME | End: 2022-10-11
Payer: MEDICARE

## 2022-10-11 PROCEDURE — 97535 SELF CARE MNGMENT TRAINING: CPT

## 2022-10-11 PROCEDURE — 97112 NEUROMUSCULAR REEDUCATION: CPT

## 2022-10-11 PROCEDURE — 97530 THERAPEUTIC ACTIVITIES: CPT

## 2022-10-11 NOTE — PROGRESS NOTES
PT DAILY TREATMENT NOTE    Patient Name: Shivani Caraballo  Date:10/11/2022  : 1950  [x]  Patient  Verified  Payor: Emilee Gonzalez / Plan: [address] / Product Type: Managed Care Medicare /    In time:12:30  Out time:1:15  Total Treatment Time (min): 45  Total Timed Codes (min): 45  1:1 Treatment Time (MC/BCBS only): 45   Visit #: 11 of 16    Treatment Dx: Other abnormal involuntary movements [R25.8]  Parkinson's disease [G20]    SUBJECTIVE  Pain Level (0-10 scale): 0/10  Any medication changes, allergies to medications, adverse drug reactions, diagnosis change, or new procedure performed?: [x] No    [] Yes (see summary sheet for update)  Subjective functional status/changes:   [] No changes reported  \"A little sore in my knee but, I'm ok. \"    OBJECTIVE  10 min Therapeutic Activity:  [x]  See flow sheet :   Rationale: increase ROM, increase strength, and improve coordination  to improve the patients ability to  return to prior level of physical activity. 25 min Neuromuscular Re-education:  [x]  See flow sheet :   Rationale: increase ROM, increase strength, and improve coordination  to improve the patients ability to return to prior level of physical activity. 10 min Self Care: Continued education with Shirt button donning and doffing    Rationale:    increase ROM, increase strength, and improve coordination to improve the patients ability to return to prior level of physical activity. With   [] TE   [] TA   [] neuro   [] other: Patient Education: [x] Review HEP    [] Progressed/Changed HEP based on:   [] positioning   [] body mechanics   [] transfers   [] heat/ice application    [] other:      Other Objective/Functional Measures: BP: 134/79, HR: 79             BP: 149/84, HR: 89     Pain Level (0-10 scale) post treatment: 0/10    ASSESSMENT/Changes in Function: Pt arrived in clinic reporting no pain and only mild soreness in knee.  Pt continues to demonstrates good performance of LSVT BIG maximal daily exercises with no loss of balance and no VC required. Pt demonstrated continued difficulty with Shirt button doffing and donning. Pt demonstrates good performance of Sit to Stand with Airex and eye clothes with only single instance of loss of balance. Pt reported no increased pain post tx. Patient will continue to benefit from skilled PT services to modify and progress therapeutic interventions, address functional mobility deficits, address ROM deficits, address strength deficits, analyze and address soft tissue restrictions, and analyze and cue movement patterns to attain remaining goals. [x]  See Plan of Care  []  See progress note/recertification  []  See Discharge Summary         Progress towards goals / Updated goals:  Short Term Goals: To be accomplished in 2 weeks:              Patient will report compliance with HEP at least 1x/day to aid in rehabilitation program.              Status at IE: to be given at visit 2              Last PN: Unable to perform due to BP issues 9/28/22              Current: is doing at least 8 of each exercise daily. 10/03/22                 Patient will decrease TUG by 3 seconds to display MCID and progression to decreased falls risk. Status at IE: 13 sec              Current: 9 sec 9/28/22 MET                            Patient will improve Mini BEST est test score to 25/28 to improve safety with gait activities. Eval Status: 17/28              Last PN: 14/28 Regression since hold 9/28/22               Current:      Long Term Goals: To be accomplished in 6 weeks:              Patient will increase strength as indicated by sit to stand transfers to 5 repetitions in < 10  seconds throughout Lalo LEs to aid in return recreational activities and ADLs.               Status at IE: 12 sec              Last PN: 12 Sec 9/28/22  Current: Pt progressed to performing sit to stands with airex pad for increased focused with stability 10/5/22                 Patient will be able to ambulate 1000 feet with SPC and good step over step gait pattern to demonstrate decreased risk for falls. Status at IE: ambulatory with Rollator and SPC using SBA for cane and mod ind for rollator. Anterior trunk lean with both AD. Decreased foot clearance noted              Last PN: ~60 ft with [facility] 9/28/22  Current:    ~500 ft without SPC and BIG gait mechanics 10/11/22                          Patient will improve FOTO to full points overall to demonstrate improvement in functional ability.               Status at IE:to be taken at visit 2               Last PN: 48 9/28/22               Current:        PLAN  [x]  Upgrade activities as tolerated     [x]  Continue plan of care  []  Update interventions per flow sheet       []  Discharge due to:_  []  Other:_      Vivien Maravilla, BRITTANI 10/11/2022  8:23 AM    Future Appointments   Date Time Provider Keagan Marshall   10/11/2022 12:30 PM [facility]   10/12/2022  2:45 PM [facility]   10/13/2022 12:30 PM [facility]   10/17/2022 12:30 PM [facility]   10/18/2022 12:30 PM Kehinde Lowe PT [facility]   10/19/2022 12:30 PM Kehinde Lowe, [address] THE [facility]   10/21/2022  2:00 PM [facility]   10/24/2022 12:30 PM [facility]   10/25/2022 12:30 PM Kehinde Lowe, [address] [facility]   10/26/2022 12:30 PM Kehinde Lowe, [address] [facility]   10/27/2022 12:30 PM Kehinde Lowe, [address] [facility]

## 2022-10-12 ENCOUNTER — HOSPITAL ENCOUNTER (OUTPATIENT)
Dept: PHYSICAL THERAPY | Age: 72
Discharge: HOME | End: 2022-10-12
Payer: MEDICARE

## 2022-10-12 PROCEDURE — 97112 NEUROMUSCULAR REEDUCATION: CPT

## 2022-10-12 PROCEDURE — 97530 THERAPEUTIC ACTIVITIES: CPT

## 2022-10-12 NOTE — PROGRESS NOTES
PT DAILY TREATMENT NOTE    Patient Name: Courtenay Spurling  Date:10/12/2022  : 1950  [x]  Patient  Verified  Payor: Tam Tineo / Plan: [employer] / Product Type: Managed Care Medicare /    In time:2:45  Out time:3:33  Total Treatment Time (min): 48  Total Timed Codes (min): 48  1:1 Treatment Time (MC/BCBS only): 48   Visit #: 12 of 16    Treatment Dx: Other abnormal involuntary movements [R25.8]  Parkinson's disease [G20]    SUBJECTIVE  Pain Level (0-10 scale): 0/10  Any medication changes, allergies to medications, adverse drug reactions, diagnosis change, or new procedure performed?: [x] No    [] Yes (see summary sheet for update)  Subjective functional status/changes:   [] No changes reported  \"No pain today. \"    OBJECTIVE    15 min Therapeutic Activity:  [x]  See flow sheet :   Rationale: increase ROM, increase strength, and improve coordination  to improve the patients ability to return to prior level of physical activity. 33 min Neuromuscular Re-education:  [x]  See flow sheet :   Rationale: increase ROM, increase strength, and improve coordination  to improve the patients ability to return to prior level of physical activity. With   [] TE   [] TA   [] neuro   [] other: Patient Education: [x] Review HEP    [] Progressed/Changed HEP based on:   [] positioning   [] body mechanics   [] transfers   [] heat/ice application    [] other:      Other Objective/Functional Measures: BP: 154/84 HR: 79                 149/91 HR: 102     Pain Level (0-10 scale) post treatment: 0/10    ASSESSMENT/Changes in Function: Pt arrived in clinic ambulating with SPC but, reporting no pain. Pt demonstrated good vitals pre and post therex this visit. Pt progressed to standing reaching with wand and cone tapping for weight shifting balance. Pt also progressed to pinch bean  for improved finger strength.  Pt also progressed to carrying ball cone for UE stability while carrying objects. Pt reported no pain post tx. Patient will continue to benefit from skilled PT services to modify and progress therapeutic interventions, address functional mobility deficits, address ROM deficits, address strength deficits, analyze and address soft tissue restrictions, analyze and cue movement patterns, and analyze and modify body mechanics/ergonomics to attain remaining goals. [x]  See Plan of Care  []  See progress note/recertification  []  See Discharge Summary         Progress towards goals / Updated goals:  Short Term Goals: To be accomplished in 2 weeks:              Patient will report compliance with HEP at least 1x/day to aid in rehabilitation program.              Status at IE: to be given at visit 2              Last PN: Unable to perform due to BP issues 9/28/22              Current: is doing at least 8 of each exercise daily. 10/03/22                 Patient will decrease TUG by 3 seconds to display MCID and progression to decreased falls risk. Status at IE: 13 sec              Current: 9 sec 9/28/22 MET                            Patient will improve Mini BEST est test score to 25/28 to improve safety with gait activities. Eval Status: 17/28              Last PN: 14/28 Regression since hold 9/28/22               Current:      Long Term Goals: To be accomplished in 6 weeks:              Patient will increase strength as indicated by sit to stand transfers to 5 repetitions in < 10  seconds throughout Lalo LEs to aid in return recreational activities and ADLs. Status at IE: 12 sec              Last PN: 12 Sec 9/28/22  Current: 10 sec 10/12/22                 Patient will be able to ambulate 1000 feet with SPC and good step over step gait pattern to demonstrate decreased risk for falls. Status at IE: ambulatory with Rollator and SPC using SBA for cane and mod ind for rollator. Anterior trunk lean with both AD. Decreased foot clearance noted              Last PN: ~60 ft with [facility] 9/28/22  Current:    ~500 ft without SPC and BIG gait mechanics 10/11/22                          Patient will improve FOTO to full points overall to demonstrate improvement in functional ability.               Status at IE:to be taken at visit 2               Last PN: 48 9/28/22               Current:     PLAN  [x]  Upgrade activities as tolerated     [x]  Continue plan of care  []  Update interventions per flow sheet       []  Discharge due to:_  []  Other:_      Arie Knox, BRITTANI 10/12/2022  12:22 PM    Future Appointments   Date Time Provider Keagan Marshall   10/12/2022  2:45 PM Ed [facility]   10/13/2022 12:30 PM Ed [facility]   10/17/2022 12:30 PM Ed [facility]   10/18/2022 12:30 PM Nery Gong, PT [facility] THE [facility]   10/19/2022 12:30 PM Nery Gong, [address] THE [facility]   10/21/2022  2:00 PM Ed [facility]   10/24/2022 12:30 PM Ed [facility]   10/25/2022 12:30 PM Nery Gong, PT [facility] THE [facility]   10/26/2022 12:30 PM Nery Gong, [address] THE [facility]   10/27/2022 12:30 PM Nery Gong, [address] THE [facility]

## 2022-10-13 ENCOUNTER — HOSPITAL ENCOUNTER (OUTPATIENT)
Dept: PHYSICAL THERAPY | Age: 72
Discharge: HOME | End: 2022-10-13
Payer: MEDICARE

## 2022-10-13 PROCEDURE — 97530 THERAPEUTIC ACTIVITIES: CPT

## 2022-10-13 PROCEDURE — 97112 NEUROMUSCULAR REEDUCATION: CPT

## 2022-10-13 PROCEDURE — 97535 SELF CARE MNGMENT TRAINING: CPT

## 2022-10-13 NOTE — PROGRESS NOTES
PT DAILY TREATMENT NOTE    Patient Name: Vane Carrasco  Date:10/13/2022  : 1950  [x]  Patient  Verified  Payor: Laila Carbonedeanna / Plan: [address] / Product Type: Managed Care Medicare /    In time:12:30  Out time:1:28  Total Treatment Time (min): 58  Total Timed Codes (min): 53  1:1 Treatment Time (MC/BCBS only): 53   Visit #: 13 of 16    Treatment Dx: Other abnormal involuntary movements [R25.8]  Parkinson's disease [G20]    SUBJECTIVE  Pain Level (0-10 scale): 0/10  Any medication changes, allergies to medications, adverse drug reactions, diagnosis change, or new procedure performed?: [x] No    [] Yes (see summary sheet for update)  Subjective functional status/changes:   [] No changes reported  \"I don't have any pain right now. \"    OBJECTIVE  15 min Therapeutic Activity:  [x]  See flow sheet :   Rationale: increase ROM, increase strength, and improve coordination  to improve the patients ability to return to prior level of physical activity. 28 min Neuromuscular Re-education:  [x]  See flow sheet :   Rationale: increase ROM, increase strength, and improve coordination  to improve the patients ability to return to prior level of physical activity. 10 min Self Care: Education with heel strike and proper knee flexion during ambulation for good gait mechanics   Rationale:    increase ROM, increase strength, and improve coordination to improve the patients ability to return to prior level of physical activity. FOTO -  5 mins          With   [] TE   [] TA   [] neuro   [] other: Patient Education: [x] Review HEP    [] Progressed/Changed HEP based on:   [] positioning   [] body mechanics   [] transfers   [] heat/ice application    [] other:      Other Objective/Functional Measures:      Pain Level (0-10 scale) post treatment: 0/10    ASSESSMENT/Changes in Function: Pt arrived reporting no increased pain at this time.  Pt continues to perform BIG maximal daily exercises independently with only occasional cues for BIG hands and chest forward during side stepping. Pt progressed to board ball balance ambulation as well as overhand weighted ball clutching for improved gripping. Pt demonstrated increased fatigue with ex but, no increased pain. Pt performed FOTO survey at end of tx. Patient will continue to benefit from skilled PT services to modify and progress therapeutic interventions, address functional mobility deficits, address ROM deficits, address strength deficits, analyze and address soft tissue restrictions, analyze and cue movement patterns, and analyze and modify body mechanics/ergonomics to attain remaining goals. [x]  See Plan of Care  []  See progress note/recertification  []  See Discharge Summary         Progress towards goals / Updated goals:  Short Term Goals: To be accomplished in 2 weeks:              Patient will report compliance with HEP at least 1x/day to aid in rehabilitation program.              Status at IE: to be given at visit 2              Last PN: Unable to perform due to BP issues 9/28/22              Current: is doing at least 8 of each exercise daily 10/03/22                 Patient will decrease TUG by 3 seconds to display MCID and progression to decreased falls risk. Status at IE: 13 sec              Current: 9 sec 9/28/22 MET                            Patient will improve Mini BEST est test score to 25/28 to improve safety with gait activities. Eval Status: 17/28              Last PN: 14/28 Regression since hold 9/28/22               Current:      Long Term Goals: To be accomplished in 6 weeks:              Patient will increase strength as indicated by sit to stand transfers to 5 repetitions in < 10  seconds throughout Lalo LEs to aid in return recreational activities and ADLs.               Status at IE: 12 sec              Last PN: 12 Sec 9/28/22  Current: 10 sec 10/12/22 Patient will be able to ambulate 1000 feet with SPC and good step over step gait pattern to demonstrate decreased risk for falls. Status at IE: ambulatory with Rollator and SPC using SBA for cane and mod ind for rollator. Anterior trunk lean with both AD. Decreased foot clearance noted              Last PN: ~60 ft with [facility] 9/28/22  Current:    ~500 ft without SPC and BIG gait mechanics 10/11/22                          Patient will improve FOTO to full points overall to demonstrate improvement in functional ability.               Status at IE:to be taken at visit 2               Last PN: 48 9/28/22               Current: 51 10/13/22       PLAN  [x]  Upgrade activities as tolerated     [x]  Continue plan of care  []  Update interventions per flow sheet       []  Discharge due to:_  []  Other:_      Antonio Johns PTA 10/13/2022  9:04 AM    Future Appointments   Date Time Provider Keagan Marshall   10/13/2022 12:30 PM [facility]   10/17/2022 12:30 PM [facility]   10/18/2022 12:30 PM Minong Stephy, [facility]   10/19/2022 12:30 PM Minong Stephy, [address] THE [facility]   10/21/2022  2:00 PM [facility]   10/24/2022 12:30 PM [facility]   10/25/2022 12:30 PM Minong Stephy, PT [facility]   10/26/2022 12:30 PM Minong Stephy, [address] THE [facility]   10/27/2022 12:30 PM Minong Stephy, [address] THE [facility]

## 2022-10-17 ENCOUNTER — HOSPITAL ENCOUNTER (OUTPATIENT)
Dept: PHYSICAL THERAPY | Age: 72
Discharge: HOME | End: 2022-10-17
Payer: MEDICARE

## 2022-10-17 PROCEDURE — 97112 NEUROMUSCULAR REEDUCATION: CPT

## 2022-10-17 PROCEDURE — 97530 THERAPEUTIC ACTIVITIES: CPT

## 2022-10-17 NOTE — PROGRESS NOTES
PT DAILY TREATMENT NOTE    Patient Name: Aryan Ness  Date:10/17/2022  : 1950  [x]  Patient  Verified  Payor: Roberto Dorantesers / Plan: [employer] / Product Type: Managed Care Medicare /    In time:12:30  Out time:1:15  Total Treatment Time (min): 45  Total Timed Codes (min): 45  1:1 Treatment Time (MC/BCBS only): 45   Visit #: 14 of 16    Treatment Dx: Other abnormal involuntary movements [R25.8]  Parkinson's disease [G20]    SUBJECTIVE  Pain Level (0-10 scale): 0/10  Any medication changes, allergies to medications, adverse drug reactions, diagnosis change, or new procedure performed?: [x] No    [] Yes (see summary sheet for update)  Subjective functional status/changes:   [] No changes reported  \"I feel pretty good today. I went to [employer] and I was able to walk up to the salad bar with my plate and back without my cane. \"    OBJECTIVE  15 min Therapeutic Activity:  [x]  See flow sheet :   Rationale: increase ROM, increase strength, and improve coordination  to improve the patients ability to return to prior level of physical activity. 30 min Neuromuscular Re-education:  [x]  See flow sheet :   Rationale: increase ROM, increase strength, and improve coordination  to improve the patients ability to return to prior level of physical activity. With   [] TE   [] TA   [] neuro   [] other: Patient Education: [x] Review HEP    [] Progressed/Changed HEP based on:   [] positioning   [] body mechanics   [] transfers   [] heat/ice application    [] other:      Other Objective/Functional Measures:      Pain Level (0-10 scale) post treatment: 0/10    ASSESSMENT/Changes in Function: Pt arrived reporting no pain. Pt reports noticing improvement with daily activities, increasing ambulation distance and being able to carry objects while walking with no difficulty.  Pt continues to progress with carrying objects while walking demonstrated little to no instance of imbalance or difficulty balancing objects. Pt progressing with  strength ex, progressing with weighted balls for gripping. Pt demonstrated improved shirt button donning and doffing with no VC needed for performance. Pt progressed to BIG wilda navigation this visit with only moderate initial difficulties clearing hurdles without making contact. Pt reported no increased pain post tx. Patient will continue to benefit from skilled PT services to modify and progress therapeutic interventions, address functional mobility deficits, address ROM deficits, address strength deficits, analyze and address soft tissue restrictions, analyze and cue movement patterns, and analyze and modify body mechanics/ergonomics to attain remaining goals. [x]  See Plan of Care  []  See progress note/recertification  []  See Discharge Summary         Progress towards goals / Updated goals:  Short Term Goals: To be accomplished in 2 weeks:              Patient will report compliance with HEP at least 1x/day to aid in rehabilitation program.              Status at IE: to be given at visit 2              Last PN: Unable to perform due to BP issues 9/28/22              Current: Pt reports continued 1-2x/day compliance with BIG exercises 10/17/22                 Patient will decrease TUG by 3 seconds to display MCID and progression to decreased falls risk. Status at IE: 13 sec              Current: 9 sec 9/28/22 MET                            Patient will improve Mini BEST est test score to 25/28 to improve safety with gait activities. Eval Status: 17/28              Last PN: 14/28 Regression since hold 9/28/22               Current:      Long Term Goals: To be accomplished in 6 weeks:              Patient will increase strength as indicated by sit to stand transfers to 5 repetitions in < 10  seconds throughout Lalo LEs to aid in return recreational activities and ADLs.               Status at IE: 15 sec              Last PN: 12 Sec 9/28/22  Current: 10 sec 10/12/22                 Patient will be able to ambulate 1000 feet with SPC and good step over step gait pattern to demonstrate decreased risk for falls. Status at IE: ambulatory with Rollator and SPC using SBA for cane and mod ind for rollator. Anterior trunk lean with both AD. Decreased foot clearance noted              Last PN: ~60 ft with [facility] 9/28/22  Current:    ~500 ft without SPC and BIG gait mechanics 10/11/22                          Patient will improve FOTO to full points overall to demonstrate improvement in functional ability.               Status at IE:to be taken at visit 2               Last PN: 48 9/28/22               Current: 51 10/13/22       PLAN  [x]  Upgrade activities as tolerated     [x]  Continue plan of care  []  Update interventions per flow sheet       []  Discharge due to:_  []  Other:_      Juliet Vanessa, BRITTANI 10/17/2022  10:36 AM    Future Appointments   Date Time Provider Keagan Marshall   10/17/2022 12:30 PM [facility]   10/18/2022 12:30 PM York Flavors, [address] THE [facility]   10/19/2022 12:30 PM York Flavors, [address] THE [facility]   10/21/2022  2:00 PM [facility]   10/24/2022 12:30 PM [facility]   10/25/2022 12:30 PM York Flavors, PT [facility]   10/26/2022 12:30 PM York Flavors, [address] THE [facility]   10/27/2022 12:30 PM York Flavors, [address] THE [facility]

## 2022-10-18 ENCOUNTER — HOSPITAL ENCOUNTER (OUTPATIENT)
Dept: PHYSICAL THERAPY | Age: 72
Discharge: HOME | End: 2022-10-18
Payer: MEDICARE

## 2022-10-18 PROCEDURE — 97530 THERAPEUTIC ACTIVITIES: CPT

## 2022-10-18 PROCEDURE — 97116 GAIT TRAINING THERAPY: CPT

## 2022-10-18 PROCEDURE — 97112 NEUROMUSCULAR REEDUCATION: CPT

## 2022-10-18 NOTE — PROGRESS NOTES
[facility]  [address] Carli Bucio, [address]  Ph [phone]  Fx [phone]    Physical Therapy Discharge Summary    Patient name: Vane Carrasco Start of Care: 2022   Referral source: Sheila Joe MD : 1950   Medical/Treatment Diagnosis: Other abnormal involuntary movements [R25.8]  Parkinson's disease [G20] Onset Date:2022   Prior Hospitalization: see medical history Provider#: [id number]   Medications: Verified on Patient Summary List     Comorbidities:  sciatica, Mini stroke,  Right knee OA, Osteoporosis, High BP, Tyroid disorders, Crohns, Lalo Foot toe fixation, scoliosis, urinary incontinence. Prior Level of Function:ambulatory with SPC prior to July, lives with , loves sewing, painting, do crafts, swim, left handed    Vis[facility] Care: 15    Missed Visits: 2    Reporting Period : 22 to 10/18/22    Goals/Measure of Progress:  Short Term Goals: To be accomplished in 2 weeks:              Patient will report compliance with HEP at least 1x/day to aid in rehabilitation program.              Status at IE: to be given at visit 2              Last PN: Unable to perform due to BP issues 22              Current: Pt reports continued 1-2x/day compliance with BIG exercises 10/17/22 GOAL MET                  Patient will decrease TUG by 3 seconds to display MCID and progression to decreased falls risk. Status at IE: 13 sec              Current: 9 sec 22 MET                            Patient will improve Mini BEST est test score to 25/28 to improve safety with gait activities. Eval Status:               Last PN:  Regression since hold 22              Progress note:  GOAL MET 10/18/22     Long Term Goals:  To be accomplished in 6 weeks:              Patient will increase strength as indicated by sit to stand transfers to 5 repetitions in < 10  seconds throughout Lalo LEs to aid in return recreational activities and ADLs. Status at IE: 12 sec              Last PN: 12 Sec 9/28/22  Progress note: 7 sec 10/18/22 GOAL MET                     Patient will be able to ambulate 1000 feet with SPC and good step over step gait pattern to demonstrate decreased risk for falls. Status at IE: ambulatory with Rollator and SPC using SBA for cane and mod ind for rollator. Anterior trunk lean with both AD. Decreased foot clearance noted              Last PN: ~60 ft with [facility] 9/28/22  Progress note:  1000 feet with cues for left arm swing and left leg stance phase. 10/18/22                  Patient will improve FOTO to full points overall to demonstrate improvement in functional ability. Status at IE:to be taken at visit 2               Last PN: 48 9/28/22               Current: 57 10/18/22 GOAL MET     Assessment/ Summary of Care: Patient tolerated treatment session well today. Patient has progressed toward all goals. She has met goal for MiniBESTest test showing improvements in standing and walking balance and decreased fall risk. Pt is also progressing toward gait goal being able to tolerate 1000 feet with no AD needing cues for arm swing at 200 feet and left leg stance phase at 500 feet. Pt has  met all goals at this time except gait goal which pt can continue to work on with HEP. Patient is ready for DC to HEP at this time.       RECOMMENDATIONS:  [x]Discontinue therapy: [x]Patient has reached or is progressing toward set goals      []Patient is non-compliant or has abdicated      []Due to lack of appreciable progress towards set goals    Verónica Rangel, PT 10/18/2022 3:28 PM

## 2022-10-18 NOTE — PROGRESS NOTES
PT DAILY TREATMENT NOTE    Patient Name: Peggy Del Cid  Date:10/18/2022  : 1950  [x]  Patient  Verified  Payor: Ranjith Montenegro / Plan: [address] / Product Type: Managed Care Medicare /    In time:1234  Out time:120  Total Treatment Time (min): 46  Total Timed Codes (min): 38  1:1 Treatment Time (MC/BCBS only): 38   Visit #: 15 of 22    Treatment Dx: Other abnormal involuntary movements [R25.8]  Parkinson's disease [G20]    SUBJECTIVE  Pain Level (0-10 scale): 0/10  Any medication changes, allergies to medications, adverse drug reactions, diagnosis change, or new procedure performed?: [x] No    [] Yes (see summary sheet for update)  Subjective functional status/changes:   [] No changes reported  Pt reports improvements in activity tolerance and being able to walk half way to the mail box. Pt reports that she was able to go to a salad bar and plate her own meal and carry it to the table. OBJECTIVE    8 min Therapeutic Activity:  []  See flow sheet : reassessment   Rationale: increase ROM, increase strength, improve coordination, improve balance, and increase proprioception  to improve the patients ability to complete transfers and ADLs without external assist     15 min Neuromuscular Re-education:  []  See flow sheet : reassessment   Rationale: increase ROM, increase strength, improve coordination, improve balance, and increase proprioception  to improve the patients ability to activate ankle and hip stabilizers without compensation    15 min Gait Trainin feet with no AD verbal instruction after apx 200 feet for left arm swing, and cues for left stance phase after apx 500 feet   Rationale: To improve ambulation safety and efficiency in order to improve patient's ability to safely ambulate at home for self care.            With   [] TE   [] TA   [] neuro   [] other: Patient Education: [x] Review HEP    [] Progressed/Changed HEP based on:   [] positioning   [] body mechanics   [] transfers   [] heat/ice application    [] other:      Other Objective/Functional Measures: see below      Pain Level (0-10 scale) post treatment: 0/10    ASSESSMENT/Changes in Function: Patient tolerated treatment session well today. Patient has progressed toward all goals. She has met goal for MiniBESTest test showing improvements in standing and walking balance and decreased fall risk. Pt is also progressing toward gait goal being able to tolerate 1000 feet with no AD needing cues for arm swing at 200 feet and left leg stance phase at 500 feet. Pt has  met all goals at this time except gait goal which pt can continue to work on with HEP. Patient is ready for DC to HEP at this time. [x]  See Plan of Care  []  See progress note/recertification  [x]  See Discharge Summary         Progress towards goals / Updated goals:  Short Term Goals: To be accomplished in 2 weeks:              Patient will report compliance with HEP at least 1x/day to aid in rehabilitation program.              Status at IE: to be given at visit 2              Last PN: Unable to perform due to BP issues 9/28/22              Current: Pt reports continued 1-2x/day compliance with BIG exercises 10/17/22 GOAL MET                  Patient will decrease TUG by 3 seconds to display MCID and progression to decreased falls risk. Status at IE: 13 sec              Current: 9 sec 9/28/22 MET                            Patient will improve Mini BEST est test score to 25/28 to improve safety with gait activities. Eval Status: 17/28              Last PN: 14/28 Regression since hold 9/28/22              Progress note: 26/28 GOAL MET 10/18/22     Long Term Goals: To be accomplished in 6 weeks:              Patient will increase strength as indicated by sit to stand transfers to 5 repetitions in < 10  seconds throughout Lalo LEs to aid in return recreational activities and ADLs. Status at IE: 12 sec              Last PN: 12 Sec 9/28/22  Progress note: 7 sec 10/18/22 GOAL MET                    Patient will be able to ambulate 1000 feet with SPC and good step over step gait pattern to demonstrate decreased risk for falls. Status at IE: ambulatory with Rollator and SPC using SBA for cane and mod ind for rollator. Anterior trunk lean with both AD. Decreased foot clearance noted              Last PN: ~60 ft with [facility] 9/28/22  Progress note:  1000 feet with cues for left arm swing and left leg stance phase. 10/18/22                  Patient will improve FOTO to full points overall to demonstrate improvement in functional ability.               Status at IE:to be taken at visit 2               Last PN: 48 9/28/22               Current: 57 10/18/22 GOAL MET        PLAN  []  Upgrade activities as tolerated     [x]  Continue plan of care  []  Update interventions per flow sheet       []  Discharge due to:_  []  Other:_      Bridget Dominguez, PT 10/18/2022  12:34 PM    Future Appointments   Date Time Provider Keagan Marshall   10/19/2022 12:30 PM Leslye Ip, [address] [facility]   10/21/2022  2:00 PM [facility]   10/24/2022 12:30 PM [facility]   10/25/2022 12:30 PM Leslye Ip, PT [facility]   10/26/2022 12:30 PM Leslye Ip, [address] [facility]   10/27/2022 12:30 PM Leslye Ip, [address] [facility]

## 2022-10-18 NOTE — PROGRESS NOTES
Physical Therapy Discharge Instructions    [facility]  [address], [address] [facility]  Ph [phone]  Fx [phone]      Patient: Miko Jo  : 1950      Continue Home Exercise Program 2 times per day for 4 weeks, then decrease to 7 times per week for the rest of your life!!! Follow up with MD:     [] Upon completion of therapy     [x] As needed      Recommendations:     [x]   Return to activity with home program    []   Return to activity with the following modifications:       []Post Rehab Program    [x]Join Independent aquatic program     [x]Return to/join local gym        Additional Comments: Continue to think BIG with all movements!             Agustin Bauer, PT 10/18/2022 1:06 PM

## 2022-10-19 ENCOUNTER — APPOINTMENT (OUTPATIENT)
Dept: PHYSICAL THERAPY | Age: 72
End: 2022-10-19
Payer: MEDICARE

## 2022-10-21 ENCOUNTER — APPOINTMENT (OUTPATIENT)
Dept: PHYSICAL THERAPY | Age: 72
End: 2022-10-21
Payer: MEDICARE

## 2022-10-24 ENCOUNTER — APPOINTMENT (OUTPATIENT)
Dept: PHYSICAL THERAPY | Age: 72
End: 2022-10-24
Payer: MEDICARE

## 2022-10-25 ENCOUNTER — APPOINTMENT (OUTPATIENT)
Dept: PHYSICAL THERAPY | Age: 72
End: 2022-10-25
Payer: MEDICARE

## 2022-10-26 ENCOUNTER — APPOINTMENT (OUTPATIENT)
Dept: PHYSICAL THERAPY | Age: 72
End: 2022-10-26
Payer: MEDICARE

## 2022-10-27 ENCOUNTER — APPOINTMENT (OUTPATIENT)
Dept: PHYSICAL THERAPY | Age: 72
End: 2022-10-27
Payer: MEDICARE

## 2025-02-04 ENCOUNTER — HOSPITAL ENCOUNTER (OUTPATIENT)
Facility: HOSPITAL | Age: 75
Discharge: HOME | End: 2025-02-07
Payer: MEDICARE

## 2025-02-04 DIAGNOSIS — G60.9 IDIOPATHIC PERIPHERAL NEUROPATHY: ICD-10-CM

## 2025-02-04 LAB
FOLATE SERPL-MCNC: >20 NG/ML (ref 3.1–17.5)
T4 FREE SERPL-MCNC: 1.3 NG/DL (ref 0.7–1.5)
TSH SERPL DL<=0.05 MIU/L-ACNC: 1.91 UIU/ML (ref 0.36–3.74)
VIT B12 SERPL-MCNC: 323 PG/ML (ref 211–911)

## 2025-02-04 PROCEDURE — 82525 ASSAY OF COPPER: CPT

## 2025-02-04 PROCEDURE — 86225 DNA ANTIBODY NATIVE: CPT

## 2025-02-04 PROCEDURE — 82607 VITAMIN B-12: CPT

## 2025-02-04 PROCEDURE — 82784 ASSAY IGA/IGD/IGG/IGM EACH: CPT

## 2025-02-04 PROCEDURE — 86235 NUCLEAR ANTIGEN ANTIBODY: CPT

## 2025-02-04 PROCEDURE — 84443 ASSAY THYROID STIM HORMONE: CPT

## 2025-02-04 PROCEDURE — 86334 IMMUNOFIX E-PHORESIS SERUM: CPT

## 2025-02-04 PROCEDURE — 82746 ASSAY OF FOLIC ACID SERUM: CPT

## 2025-02-04 PROCEDURE — 36415 COLL VENOUS BLD VENIPUNCTURE: CPT

## 2025-02-04 PROCEDURE — 84439 ASSAY OF FREE THYROXINE: CPT

## 2025-02-05 LAB
CENTROMERE B AB SER-ACNC: <0.2 AI (ref 0–0.9)
CHROMATIN AB SERPL-ACNC: <0.2 AI (ref 0–0.9)
DSDNA AB SER-ACNC: <1 IU/ML (ref 0–9)
ENA JO1 AB SER-ACNC: <0.2 AI (ref 0–0.9)
ENA RNP AB SER-ACNC: <0.2 AI (ref 0–0.9)
ENA SCL70 AB SER-ACNC: 0.2 AI (ref 0–0.9)
ENA SM AB SER-ACNC: <0.2 AI (ref 0–0.9)
ENA SS-A AB SER-ACNC: 0.2 AI (ref 0–0.9)
ENA SS-B AB SER-ACNC: <0.2 AI (ref 0–0.9)
Lab: NORMAL

## 2025-02-06 LAB
COPPER SERPL-MCNC: 94 UG/DL (ref 80–158)
IGA SERPL-MCNC: 88 MG/DL (ref 64–422)
IGG SERPL-MCNC: 893 MG/DL (ref 586–1602)
IGM SERPL-MCNC: 186 MG/DL (ref 26–217)
PROT PATTERN SERPL IFE-IMP: NORMAL

## 2025-02-07 LAB
FAX TO NUMBER: NORMAL
TEST RESULTS FAXED TO: NORMAL